# Patient Record
Sex: MALE | Race: BLACK OR AFRICAN AMERICAN | NOT HISPANIC OR LATINO | Employment: FULL TIME | URBAN - METROPOLITAN AREA
[De-identification: names, ages, dates, MRNs, and addresses within clinical notes are randomized per-mention and may not be internally consistent; named-entity substitution may affect disease eponyms.]

---

## 2023-07-26 PROBLEM — M54.9 BACK PAIN: Status: ACTIVE | Noted: 2023-07-26

## 2023-07-26 PROBLEM — M25.519 SHOULDER PAIN: Status: ACTIVE | Noted: 2023-07-26

## 2023-07-26 PROBLEM — N18.9 CKD (CHRONIC KIDNEY DISEASE): Status: ACTIVE | Noted: 2023-07-26

## 2023-07-26 PROBLEM — M25.569 KNEE PAIN: Status: ACTIVE | Noted: 2023-07-26

## 2023-07-26 PROBLEM — Z76.89 ENCOUNTER TO ESTABLISH CARE: Status: ACTIVE | Noted: 2023-07-26

## 2023-07-26 PROBLEM — Z76.0 MEDICATION REFILL: Status: ACTIVE | Noted: 2023-07-26

## 2023-12-22 PROBLEM — S93.491A SPRAIN OF ANTERIOR TALOFIBULAR LIGAMENT OF RIGHT ANKLE: Status: ACTIVE | Noted: 2023-12-22

## 2023-12-22 PROBLEM — M17.31 POST-TRAUMATIC OSTEOARTHRITIS OF RIGHT KNEE: Status: ACTIVE | Noted: 2023-12-22

## 2023-12-28 PROBLEM — F32.2 CURRENT SEVERE EPISODE OF MAJOR DEPRESSIVE DISORDER WITHOUT PSYCHOTIC FEATURES: Status: ACTIVE | Noted: 2023-12-28

## 2024-01-29 PROBLEM — I10 ESSENTIAL HYPERTENSION: Status: ACTIVE | Noted: 2024-01-29

## 2024-03-05 ENCOUNTER — SOCIAL WORK (OUTPATIENT)
Dept: ADMINISTRATIVE | Facility: OTHER | Age: 54
End: 2024-03-05

## 2024-03-05 NOTE — PROGRESS NOTES
Sw received a referral to assist with counseling services. [facility] had received a consult to see Patient. However, the Clinic does not have a counselor on staff. Sw mailed Patient the contact information for some in-network providers. He was encouraged to contact one to schedule an assessment if he was still in need of services.    Liv Tavarez LCSW    [phone]

## 2024-04-17 ENCOUNTER — SOCIAL WORK (OUTPATIENT)
Dept: ADMINISTRATIVE | Facility: OTHER | Age: 54
End: 2024-04-17

## 2024-04-17 NOTE — PROGRESS NOTES
Consult received to assist with rehab placement post op.  Pt is workman's comp.  New representative assigned to the case is Sophia Gray at [phone].  Message left for a return call to discuss coverage for placement.  Will con't to follow.    Pallavi Sharp, [facility] [place] [phone]      WC agent at [employer]    Sophia Doig  [phone]  [email or web address]      Call back from Sophia.  She will need the clinical notes from there pre-op visit before she can finalize the authorization.  SW will f/u and fax when completed

## 2024-04-22 ENCOUNTER — SOCIAL WORK (OUTPATIENT)
Dept: ADMINISTRATIVE | Facility: OTHER | Age: 54
End: 2024-04-22

## 2024-04-22 NOTE — PROGRESS NOTES
Clinic note from 4/17 faxed to WC agent Sophia Gray.  Will con't to follow and assist with approval process.    Pallavi Sharp, [facility] [place]  Ext [phone]

## 2024-04-26 ENCOUNTER — SOCIAL WORK (OUTPATIENT)
Dept: ADMINISTRATIVE | Facility: OTHER | Age: 54
End: 2024-04-26

## 2024-04-26 NOTE — PROGRESS NOTES
Notified that pt's workman's comp has denied coverage therefore, surgery to be billed through pt's Medicaid.  Pt is wanting to appeal this decision and have WC pay for services.  Spoke with pt via phone to discuss going forward with surgery on 5/9.  Pt states he wants to call WC and work this out directly with them as he is not comfortable with fact they have denied.  Explained we will follow up with him next week to see what the outcome is before we cancel surgery.  Pt voiced agreement.  Violetta in Dr. Alfaro's office updated.    Pallavi Sharp, [facility] [place]  Ext [phone]

## 2024-04-30 ENCOUNTER — SOCIAL WORK (OUTPATIENT)
Dept: ADMINISTRATIVE | Facility: OTHER | Age: 54
End: 2024-04-30

## 2024-04-30 NOTE — PROGRESS NOTES
"F/u call to pt reg decision for surgery.  Pt stated he has been "advised to not have the surgery" and therefore wants to cancel.  He has also been "advised to set up an appt with Dr. Alfaro to discuss a plan going forward that does not involve surgery".  Violetta in Dominic Boyd's office updated.  No other needs at this time.    Pallavi Sharp, [facility] [place]  Ext [phone]    "

## 2024-07-15 PROBLEM — R09.02 HYPOXIA: Status: ACTIVE | Noted: 2024-07-15

## 2024-07-15 PROBLEM — R09.02 HYPOXIA: Status: RESOLVED | Noted: 2024-07-15 | Resolved: 2024-07-15

## 2024-07-15 PROBLEM — F32.2 CURRENT SEVERE EPISODE OF MAJOR DEPRESSIVE DISORDER WITHOUT PSYCHOTIC FEATURES: Status: RESOLVED | Noted: 2023-12-28 | Resolved: 2024-07-15

## 2024-11-21 ENCOUNTER — PATIENT MESSAGE (OUTPATIENT)
Dept: ADMINISTRATIVE | Facility: HOSPITAL | Age: 54
End: 2024-11-21

## 2025-01-15 PROBLEM — E55.9 VITAMIN D INSUFFICIENCY: Status: ACTIVE | Noted: 2025-01-15

## 2025-01-23 PROBLEM — J06.9 UPPER RESPIRATORY INFECTION: Status: ACTIVE | Noted: 2025-01-23

## 2025-03-17 ENCOUNTER — PATIENT MESSAGE (OUTPATIENT)
Dept: ADMINISTRATIVE | Facility: HOSPITAL | Age: 55
End: 2025-03-17

## 2025-03-20 PROBLEM — Z96.651 S/P TOTAL KNEE ARTHROPLASTY, RIGHT: Status: ACTIVE | Noted: 2025-03-20

## 2025-03-20 PROBLEM — N18.30 STAGE 3 CHRONIC KIDNEY DISEASE: Status: ACTIVE | Noted: 2023-07-26

## 2025-03-21 ENCOUNTER — OUTPATIENT CASE MANAGEMENT (OUTPATIENT)
Dept: ADMINISTRATIVE | Facility: OTHER | Age: 55
End: 2025-03-21

## 2025-03-21 NOTE — PROGRESS NOTES
Pt will need HH upon D/C.  These arrangements will need to be approved through workman's comp.  At this time, [facility] and [facility] have denied pt.  Referral sent to [facility] for review.  Working on contact information for adjustor and collaborating with inpatient  on arrangements.  Will con't to follow up.    Pallavi Sharp, [facility] [place]  Ext [phone]

## 2025-06-16 ENCOUNTER — PATIENT MESSAGE (OUTPATIENT)
Dept: ADMINISTRATIVE | Facility: HOSPITAL | Age: 55
End: 2025-06-16